# Patient Record
Sex: MALE | Race: OTHER | HISPANIC OR LATINO
[De-identification: names, ages, dates, MRNs, and addresses within clinical notes are randomized per-mention and may not be internally consistent; named-entity substitution may affect disease eponyms.]

---

## 2019-03-21 ENCOUNTER — APPOINTMENT (OUTPATIENT)
Dept: GASTROENTEROLOGY | Facility: CLINIC | Age: 59
End: 2019-03-21
Payer: MEDICARE

## 2019-03-21 ENCOUNTER — OTHER (OUTPATIENT)
Age: 59
End: 2019-03-21

## 2019-03-21 ENCOUNTER — TRANSCRIPTION ENCOUNTER (OUTPATIENT)
Age: 59
End: 2019-03-21

## 2019-03-21 VITALS
RESPIRATION RATE: 15 BRPM | OXYGEN SATURATION: 98 % | HEART RATE: 99 BPM | SYSTOLIC BLOOD PRESSURE: 160 MMHG | DIASTOLIC BLOOD PRESSURE: 103 MMHG | BODY MASS INDEX: 23.77 KG/M2 | HEIGHT: 70 IN | WEIGHT: 166 LBS

## 2019-03-21 DIAGNOSIS — Z87.891 PERSONAL HISTORY OF NICOTINE DEPENDENCE: ICD-10-CM

## 2019-03-21 DIAGNOSIS — Z78.9 OTHER SPECIFIED HEALTH STATUS: ICD-10-CM

## 2019-03-21 DIAGNOSIS — Z82.0 FAMILY HISTORY OF EPILEPSY AND OTHER DISEASES OF THE NERVOUS SYSTEM: ICD-10-CM

## 2019-03-21 PROCEDURE — 99401 PREV MED CNSL INDIV APPRX 15: CPT

## 2019-03-21 PROCEDURE — 99204 OFFICE O/P NEW MOD 45 MIN: CPT

## 2019-03-21 RX ORDER — FAMOTIDINE 20 MG/1
20 TABLET, FILM COATED ORAL
Refills: 0 | Status: DISCONTINUED | COMMUNITY
End: 2019-03-21

## 2019-03-21 NOTE — ASSESSMENT
[FreeTextEntry1] : Epigastric pain: Likely acute on chronic GERD.  Improves with famotidine.  Will stop famotidine and start omeprazole 40 mg daily and plan for EGD.  Will also send for abdominal ultrasound to rule out an pancreaticobiliary pathology.  Sending basic labs including lipase and amylase given his heavy alcohol history.\par \par CRC screening: Patient has history of colon polyps.  He is overdue for a surveillance colonoscopy.  TriLyte prep ordered.\par \par Alcohol abuse: Advised patient that with his continued consumption of alcohol, he will end up with cirrhosis.  He expressed understanding but does not appear to be interested in stopping.

## 2019-03-21 NOTE — REVIEW OF SYSTEMS
[As Noted in HPI] : as noted in HPI [Limb Weakness] : limb weakness [Difficulty Walking] : difficulty walking [Negative] : Heme/Lymph [Vomiting] : no vomiting [Constipation] : no constipation [Diarrhea] : no diarrhea [Melena] : no melena

## 2019-03-21 NOTE — HISTORY OF PRESENT ILLNESS
[de-identified] : This is a 59-year-old man with a past medical history of chronic pain secondary to a traumatic accident while working status post neurostimulator for his chronic back pain who presents for evaluation of 20 years of gastroesophageal reflux disease.  The patient reports his gastroesophageal reflux disease has been quiescent for the last several years but has recently become a problem again.  He describes upper abdominal/epigastric burning.  He is unaware of any exacerbating factors and finds that famotidine as been a little bit helpful.  Additionally, he reports that when he sleeps on his left side, his reflux is somewhat better.  He last underwent an EGD and colonoscopy approximately 10 years ago, the latter of which was notable for "some polyps".  The patient denies any changes in his weight or appetite.  He reports drinking a sixpack of beer daily as an analgesic for his chronic pain.

## 2019-03-21 NOTE — CONSULT LETTER
[Dear  ___] : Dear  [unfilled], [Consult Letter:] : I had the pleasure of evaluating your patient, [unfilled]. [Please see my note below.] : Please see my note below. [Consult Closing:] : Thank you very much for allowing me to participate in the care of this patient.  If you have any questions, please do not hesitate to contact me. [FreeTextEntry3] : Very truly yours,\par \par CHELI Goodrich MD\par [facility]\par Gastroenterology at [place]\par [address], [address]\[place] [address]\par Tel [phone]\par Fax [phone]

## 2019-03-28 ENCOUNTER — OUTPATIENT (OUTPATIENT)
Dept: OUTPATIENT SERVICES | Facility: HOSPITAL | Age: 59
LOS: 1 days | End: 2019-03-28

## 2019-03-28 ENCOUNTER — APPOINTMENT (OUTPATIENT)
Dept: ULTRASOUND IMAGING | Facility: CLINIC | Age: 59
End: 2019-03-28
Payer: MEDICARE

## 2019-03-28 DIAGNOSIS — R10.13 EPIGASTRIC PAIN: ICD-10-CM

## 2019-03-28 LAB
ALBUMIN SERPL ELPH-MCNC: 4.9 G/DL
ALP BLD-CCNC: 62 U/L
ALT SERPL-CCNC: 16 U/L
AMYLASE/CREAT SERPL: 194 U/L
ANION GAP SERPL CALC-SCNC: 12 MMOL/L
AST SERPL-CCNC: 29 U/L
BASOPHILS # BLD AUTO: 0.03 K/UL
BASOPHILS NFR BLD AUTO: 0.4 %
BILIRUB SERPL-MCNC: 0.4 MG/DL
BUN SERPL-MCNC: 5 MG/DL
CALCIUM SERPL-MCNC: 9.6 MG/DL
CHLORIDE SERPL-SCNC: 95 MMOL/L
CO2 SERPL-SCNC: 28 MMOL/L
CREAT SERPL-MCNC: 0.83 MG/DL
EOSINOPHIL # BLD AUTO: 0.04 K/UL
EOSINOPHIL NFR BLD AUTO: 0.5 %
GLUCOSE SERPL-MCNC: 92 MG/DL
HCT VFR BLD CALC: 44.4 %
HGB BLD-MCNC: 14.6 G/DL
IMM GRANULOCYTES NFR BLD AUTO: 0.1 %
INR PPP: 0.97 RATIO
LPL SERPL-CCNC: 101 U/L
LYMPHOCYTES # BLD AUTO: 2.91 K/UL
LYMPHOCYTES NFR BLD AUTO: 39.8 %
MAN DIFF?: NORMAL
MCHC RBC-ENTMCNC: 32 PG
MCHC RBC-ENTMCNC: 32.9 GM/DL
MCV RBC AUTO: 97.4 FL
MONOCYTES # BLD AUTO: 0.62 K/UL
MONOCYTES NFR BLD AUTO: 8.5 %
NEUTROPHILS # BLD AUTO: 3.7 K/UL
NEUTROPHILS NFR BLD AUTO: 50.7 %
PLATELET # BLD AUTO: 305 K/UL
POTASSIUM SERPL-SCNC: 4.7 MMOL/L
PROT SERPL-MCNC: 7.8 G/DL
PT BLD: 10.9 SEC
RBC # BLD: 4.56 M/UL
RBC # FLD: 12 %
SODIUM SERPL-SCNC: 135 MMOL/L
WBC # FLD AUTO: 7.31 K/UL

## 2019-03-28 PROCEDURE — 76700 US EXAM ABDOM COMPLETE: CPT | Mod: 26

## 2019-05-29 ENCOUNTER — APPOINTMENT (OUTPATIENT)
Dept: GASTROENTEROLOGY | Facility: CLINIC | Age: 59
End: 2019-05-29
Payer: MEDICARE

## 2019-05-29 VITALS
DIASTOLIC BLOOD PRESSURE: 82 MMHG | HEART RATE: 103 BPM | OXYGEN SATURATION: 99 % | SYSTOLIC BLOOD PRESSURE: 138 MMHG | TEMPERATURE: 98.6 F

## 2019-05-29 DIAGNOSIS — R10.13 EPIGASTRIC PAIN: ICD-10-CM

## 2019-05-29 DIAGNOSIS — F10.10 ALCOHOL ABUSE, UNCOMPLICATED: ICD-10-CM

## 2019-05-29 PROCEDURE — 99215 OFFICE O/P EST HI 40 MIN: CPT

## 2019-05-29 NOTE — REASON FOR VISIT
[Follow-Up: _____] : a [unfilled] follow-up visit [Spouse] : spouse [[employer] ] : provided by [employer]   [FreeTextEntry1] : [id number] [FreeTextEntry2] : Leni

## 2019-05-29 NOTE — CONSULT LETTER
[Dear  ___] : Dear  [unfilled], [Please see my note below.] : Please see my note below. [Courtesy Letter:] : I had the pleasure of seeing your patient, [unfilled], in my office today. [Consult Closing:] : Thank you very much for allowing me to participate in the care of this patient.  If you have any questions, please do not hesitate to contact me.

## 2019-05-29 NOTE — ASSESSMENT
[FreeTextEntry1] : Epigastric pain: EGD scheduled, continue omeprazole.\par \par CRC screening: Patient has history of colon polyps.  He is overdue for a surveillance colonoscopy.  TriLyte prep ordered.\par \par Alcohol abuse: Advised patient that with his continued consumption of alcohol, he will end up with cirrhosis.  He expressed understanding but does not appear to be interested in stopping.\par \par Lightheadedness: Possible hypoglycemic events from alcohol abuse.  Advised follow up with PCP.

## 2019-05-29 NOTE — HISTORY OF PRESENT ILLNESS
[de-identified] : Patient returns today to review his lab and ultrasound results.  Complains of new lightheadedness and feelings like he is going to faint.  No new medications or dietary changes.  Continues to drink 12 beers daily.  No rectal bleeding or melena.  Reports "flat" stool.  Blood work unremarkable.  Ultrasound OK.

## 2019-06-13 ENCOUNTER — APPOINTMENT (OUTPATIENT)
Dept: GASTROENTEROLOGY | Facility: GI CENTER | Age: 59
End: 2019-06-13

## 2019-06-17 ENCOUNTER — MEDICATION RENEWAL (OUTPATIENT)
Age: 59
End: 2019-06-17

## 2019-06-20 ENCOUNTER — OUTPATIENT (OUTPATIENT)
Dept: OUTPATIENT SERVICES | Facility: HOSPITAL | Age: 59
LOS: 1 days | End: 2019-06-20
Payer: COMMERCIAL

## 2019-06-20 ENCOUNTER — RESULT REVIEW (OUTPATIENT)
Age: 59
End: 2019-06-20

## 2019-06-20 ENCOUNTER — APPOINTMENT (OUTPATIENT)
Dept: GASTROENTEROLOGY | Facility: GI CENTER | Age: 59
End: 2019-06-20
Payer: MEDICARE

## 2019-06-20 DIAGNOSIS — D12.4 BENIGN NEOPLASM OF DESCENDING COLON: ICD-10-CM

## 2019-06-20 DIAGNOSIS — K64.8 OTHER HEMORRHOIDS: ICD-10-CM

## 2019-06-20 DIAGNOSIS — Z12.11 ENCOUNTER FOR SCREENING FOR MALIGNANT NEOPLASM OF COLON: ICD-10-CM

## 2019-06-20 DIAGNOSIS — K64.4 RESIDUAL HEMORRHOIDAL SKIN TAGS: ICD-10-CM

## 2019-06-20 PROCEDURE — 45380 COLONOSCOPY AND BIOPSY: CPT | Mod: 33

## 2019-06-20 PROCEDURE — 88305 TISSUE EXAM BY PATHOLOGIST: CPT | Mod: 26

## 2019-06-20 PROCEDURE — 45380 COLONOSCOPY AND BIOPSY: CPT | Mod: PT

## 2019-06-20 PROCEDURE — 88305 TISSUE EXAM BY PATHOLOGIST: CPT

## 2019-06-20 RX ORDER — POLYETHYLENE GLYOCOL 3350, SODIUM CHLORIDE, SODIUM BICARBONATE AND POTASSIUM CHLORIDE 420; 11.2; 5.72; 1.48 G/4L; G/4L; G/4L; G/4L
420 POWDER, FOR SOLUTION NASOGASTRIC; ORAL
Qty: 1 | Refills: 0 | Status: COMPLETED | COMMUNITY
Start: 2019-03-21 | End: 2019-06-20

## 2019-06-20 NOTE — ASSESSMENT
[FreeTextEntry1] : This is a 59-year-old man presenting for a screening colonoscopy.  The exam was notable for a diminutive sessile polyp in the descending colon that was resected and retrieved using jumbo cold forceps polypectomy.  The exam was also notable for sigmoid diverticulosis, mild.  Lastly, there were large internal hemorrhoids and moderately sized external hemorrhoids seen on retroflexed view.  There was significantly decreased anal sphincter tone on digital rectal examination.\par \par Followup pathology\par Repeat colonoscopy in five years if polyp is adenomatous

## 2019-06-20 NOTE — PROCEDURE
[With Biopsy] : with biopsy [With Polypectomy] : polypectomy [Colon Cancer Screening] : colon cancer screening [Procedure Explained] : The procedure was explained [Allergies Reviewed] : allergies reviewed. [Benefits] : benefits [Alternatives] : alternatives [Risks] : Risks [Infection] : risk of infection [Bleeding] : bleeding risk [Patient] : the patient [Consent Obtained] : written consent was obtained prior to the procedure and is detailed in the patient's record [Bowel Prep Kit] : the patient took the appropriate bowel preparation kit as directed [Approved Diet Followed] : the patient avoided solid foods and adhered to the approved diet list for 24 hours prior to the procedure [Automated Blood Pressure Cuff] : automated blood pressure cuff [Cardiac Monitor] : cardiac monitor [Pulse Oximeter] : pulse oximeter [Sedation Clearance] : the patient was cleared for moderate sedation [Propofol ___ mg IV] : Propofol [unfilled] ~Umg intravenously [3] : 3 [Withdrawal Time: ___] : Withdrawal Time:  [unfilled] [Prep Qualtiy: ___] : Prep Quality:  [unfilled] [Left Lateral Decubitus] : The patient was positioned in the left lateral decubitus position [Performed By: ___] : Performed by:  BIBIANA [Abnormal Rectum] : an abnormal rectum [Cecum (Landmarks)] : and guided to the cecum which was identified by the anatomic landmarks of the appendiceal orifice and ileocecal valve [Normal Prostate] : a normal prostate [No Difficulty] : without difficulty [Insufflated] : insufflated [Single Pass Needed] : after a single pass [Retroflex View] : a retroflex view of the rectum was performed [Normal] : Normal [Polyps] : polyps [Diverticulosis] : diverticulosis [Biopsy] : biopsy [Hemorrhoids] : hemorrhoids [Vital Signs Stable] : the vital signs were stable [Sent to Pathology] : was sent to pathology for analysis [Tolerated Well] : the patient tolerated the procedure well [No Complications] : There were no complications [de-identified] : Decreased sphincter tone [FreeTextEntry2] : Olympus [id number]

## 2019-06-20 NOTE — PROCEDURE
[With Biopsy] : with biopsy [Colon Cancer Screening] : colon cancer screening [With Polypectomy] : polypectomy [Allergies Reviewed] : allergies reviewed. [Procedure Explained] : The procedure was explained [Alternatives] : alternatives [Risks] : Risks [Benefits] : benefits [Bleeding] : bleeding risk [Infection] : risk of infection [Bowel Prep Kit] : the patient took the appropriate bowel preparation kit as directed [Patient] : the patient [Consent Obtained] : written consent was obtained prior to the procedure and is detailed in the patient's record [Approved Diet Followed] : the patient avoided solid foods and adhered to the approved diet list for 24 hours prior to the procedure [Automated Blood Pressure Cuff] : automated blood pressure cuff [Cardiac Monitor] : cardiac monitor [Pulse Oximeter] : pulse oximeter [Sedation Clearance] : the patient was cleared for moderate sedation [Propofol ___ mg IV] : Propofol [unfilled] ~Umg intravenously [3] : 3 [Prep Qualtiy: ___] : Prep Quality:  [unfilled] [Withdrawal Time: ___] : Withdrawal Time:  [unfilled] [Performed By: ___] : Performed by:  BIBIANA [Left Lateral Decubitus] : The patient was positioned in the left lateral decubitus position [Normal Prostate] : a normal prostate [Abnormal Rectum] : an abnormal rectum [Cecum (Landmarks)] : and guided to the cecum which was identified by the anatomic landmarks of the appendiceal orifice and ileocecal valve [Insufflated] : insufflated [No Difficulty] : without difficulty [Single Pass Needed] : after a single pass [Retroflex View] : a retroflex view of the rectum was performed [Normal] : Normal [Polyps] : polyps [Diverticulosis] : diverticulosis [Biopsy] : biopsy [Hemorrhoids] : hemorrhoids [Vital Signs Stable] : the vital signs were stable [Sent to Pathology] : was sent to pathology for analysis [Tolerated Well] : the patient tolerated the procedure well [No Complications] : There were no complications [de-identified] : Decreased sphincter tone [FreeTextEntry2] : Olympus [id number]

## 2019-06-24 LAB — SURGICAL PATHOLOGY STUDY: SIGNIFICANT CHANGE UP

## 2019-07-02 ENCOUNTER — OTHER (OUTPATIENT)
Age: 59
End: 2019-07-02

## 2019-07-02 DIAGNOSIS — D12.6 BENIGN NEOPLASM OF COLON, UNSPECIFIED: ICD-10-CM

## 2019-08-26 ENCOUNTER — MEDICATION RENEWAL (OUTPATIENT)
Age: 59
End: 2019-08-26

## 2019-08-26 RX ORDER — OMEPRAZOLE 40 MG/1
40 CAPSULE, DELAYED RELEASE ORAL
Qty: 90 | Refills: 1 | Status: ACTIVE | COMMUNITY
Start: 2019-03-21 | End: 1900-01-01

## 2024-05-24 ENCOUNTER — OUTPATIENT (OUTPATIENT)
Dept: OUTPATIENT SERVICES | Facility: HOSPITAL | Age: 64
LOS: 1 days | End: 2024-05-24
Payer: MEDICARE

## 2024-05-24 ENCOUNTER — APPOINTMENT (OUTPATIENT)
Dept: ULTRASOUND IMAGING | Facility: CLINIC | Age: 64
End: 2024-05-24

## 2024-05-24 DIAGNOSIS — I65.29 OCCLUSION AND STENOSIS OF UNSPECIFIED CAROTID ARTERY: ICD-10-CM

## 2024-05-24 PROCEDURE — 93880 EXTRACRANIAL BILAT STUDY: CPT | Mod: 26

## 2024-06-02 ENCOUNTER — EMERGENCY (EMERGENCY)
Facility: HOSPITAL | Age: 64
LOS: 1 days | Discharge: HOME | End: 2024-06-02
Attending: STUDENT IN AN ORGANIZED HEALTH CARE EDUCATION/TRAINING PROGRAM
Payer: SELF-PAY

## 2024-06-02 VITALS
SYSTOLIC BLOOD PRESSURE: 97 MMHG | TEMPERATURE: 98 F | WEIGHT: 167.11 LBS | RESPIRATION RATE: 20 BRPM | HEART RATE: 108 BPM | HEIGHT: 70 IN | DIASTOLIC BLOOD PRESSURE: 67 MMHG | OXYGEN SATURATION: 100 %

## 2024-06-02 LAB
ALBUMIN SERPL ELPH-MCNC: 4.3 G/DL — SIGNIFICANT CHANGE UP (ref 3.3–5.2)
ALP SERPL-CCNC: 102 U/L — SIGNIFICANT CHANGE UP (ref 40–120)
ALT FLD-CCNC: 9 U/L — SIGNIFICANT CHANGE UP
ANION GAP SERPL CALC-SCNC: 12 MMOL/L — SIGNIFICANT CHANGE UP (ref 5–17)
AST SERPL-CCNC: 12 U/L — SIGNIFICANT CHANGE UP
BASOPHILS # BLD AUTO: 0.03 K/UL — SIGNIFICANT CHANGE UP (ref 0–0.2)
BASOPHILS NFR BLD AUTO: 0.3 % — SIGNIFICANT CHANGE UP (ref 0–2)
BILIRUB SERPL-MCNC: 0.2 MG/DL — LOW (ref 0.4–2)
BUN SERPL-MCNC: 23.3 MG/DL — HIGH (ref 8–20)
CALCIUM SERPL-MCNC: 9 MG/DL — SIGNIFICANT CHANGE UP (ref 8.4–10.5)
CHLORIDE SERPL-SCNC: 98 MMOL/L — SIGNIFICANT CHANGE UP (ref 96–108)
CO2 SERPL-SCNC: 24 MMOL/L — SIGNIFICANT CHANGE UP (ref 22–29)
CREAT SERPL-MCNC: 1.66 MG/DL — HIGH (ref 0.5–1.3)
EGFR: 45 ML/MIN/1.73M2 — LOW
EOSINOPHIL # BLD AUTO: 0.09 K/UL — SIGNIFICANT CHANGE UP (ref 0–0.5)
EOSINOPHIL NFR BLD AUTO: 0.8 % — SIGNIFICANT CHANGE UP (ref 0–6)
GLUCOSE SERPL-MCNC: 108 MG/DL — HIGH (ref 70–99)
HCT VFR BLD CALC: 40.3 % — SIGNIFICANT CHANGE UP (ref 39–50)
HGB BLD-MCNC: 13.8 G/DL — SIGNIFICANT CHANGE UP (ref 13–17)
IMM GRANULOCYTES NFR BLD AUTO: 0.5 % — SIGNIFICANT CHANGE UP (ref 0–0.9)
LYMPHOCYTES # BLD AUTO: 2.75 K/UL — SIGNIFICANT CHANGE UP (ref 1–3.3)
LYMPHOCYTES # BLD AUTO: 25.3 % — SIGNIFICANT CHANGE UP (ref 13–44)
MCHC RBC-ENTMCNC: 30.4 PG — SIGNIFICANT CHANGE UP (ref 27–34)
MCHC RBC-ENTMCNC: 34.2 GM/DL — SIGNIFICANT CHANGE UP (ref 32–36)
MCV RBC AUTO: 88.8 FL — SIGNIFICANT CHANGE UP (ref 80–100)
MONOCYTES # BLD AUTO: 0.68 K/UL — SIGNIFICANT CHANGE UP (ref 0–0.9)
MONOCYTES NFR BLD AUTO: 6.3 % — SIGNIFICANT CHANGE UP (ref 2–14)
NEUTROPHILS # BLD AUTO: 7.27 K/UL — SIGNIFICANT CHANGE UP (ref 1.8–7.4)
NEUTROPHILS NFR BLD AUTO: 66.8 % — SIGNIFICANT CHANGE UP (ref 43–77)
PLATELET # BLD AUTO: 318 K/UL — SIGNIFICANT CHANGE UP (ref 150–400)
POTASSIUM SERPL-MCNC: 5.1 MMOL/L — SIGNIFICANT CHANGE UP (ref 3.5–5.3)
POTASSIUM SERPL-SCNC: 5.1 MMOL/L — SIGNIFICANT CHANGE UP (ref 3.5–5.3)
PROT SERPL-MCNC: 7.4 G/DL — SIGNIFICANT CHANGE UP (ref 6.6–8.7)
RBC # BLD: 4.54 M/UL — SIGNIFICANT CHANGE UP (ref 4.2–5.8)
RBC # FLD: 11.8 % — SIGNIFICANT CHANGE UP (ref 10.3–14.5)
SODIUM SERPL-SCNC: 134 MMOL/L — LOW (ref 135–145)
TROPONIN T, HIGH SENSITIVITY RESULT: 7 NG/L — SIGNIFICANT CHANGE UP (ref 0–51)
TROPONIN T, HIGH SENSITIVITY RESULT: 9 NG/L — SIGNIFICANT CHANGE UP (ref 0–51)
WBC # BLD: 10.87 K/UL — HIGH (ref 3.8–10.5)
WBC # FLD AUTO: 10.87 K/UL — HIGH (ref 3.8–10.5)

## 2024-06-02 PROCEDURE — 70551 MRI BRAIN STEM W/O DYE: CPT | Mod: 26,MC

## 2024-06-02 PROCEDURE — 71045 X-RAY EXAM CHEST 1 VIEW: CPT | Mod: 26

## 2024-06-02 PROCEDURE — 93010 ELECTROCARDIOGRAM REPORT: CPT

## 2024-06-02 PROCEDURE — 70450 CT HEAD/BRAIN W/O DYE: CPT | Mod: 26,MC

## 2024-06-02 PROCEDURE — 71275 CT ANGIOGRAPHY CHEST: CPT | Mod: 26,MC

## 2024-06-02 PROCEDURE — 99223 1ST HOSP IP/OBS HIGH 75: CPT

## 2024-06-02 PROCEDURE — 74174 CTA ABD&PLVS W/CONTRAST: CPT | Mod: 26,MC

## 2024-06-02 RX ORDER — SODIUM CHLORIDE 9 MG/ML
1000 INJECTION INTRAMUSCULAR; INTRAVENOUS; SUBCUTANEOUS ONCE
Refills: 0 | Status: COMPLETED | OUTPATIENT
Start: 2024-06-02 | End: 2024-06-02

## 2024-06-02 RX ORDER — FINASTERIDE 5 MG/1
5 TABLET, FILM COATED ORAL DAILY
Refills: 0 | Status: DISCONTINUED | OUTPATIENT
Start: 2024-06-02 | End: 2024-06-10

## 2024-06-02 RX ORDER — MIDODRINE HYDROCHLORIDE 2.5 MG/1
10 TABLET ORAL THREE TIMES A DAY
Refills: 0 | Status: DISCONTINUED | OUTPATIENT
Start: 2024-06-02 | End: 2024-06-10

## 2024-06-02 RX ADMIN — SODIUM CHLORIDE 1000 MILLILITER(S): 9 INJECTION INTRAMUSCULAR; INTRAVENOUS; SUBCUTANEOUS at 20:50

## 2024-06-02 RX ADMIN — SODIUM CHLORIDE 1000 MILLILITER(S): 9 INJECTION INTRAMUSCULAR; INTRAVENOUS; SUBCUTANEOUS at 21:30

## 2024-06-02 RX ADMIN — MIDODRINE HYDROCHLORIDE 10 MILLIGRAM(S): 2.5 TABLET ORAL at 22:57

## 2024-06-02 RX ADMIN — FINASTERIDE 5 MILLIGRAM(S): 5 TABLET, FILM COATED ORAL at 22:57

## 2024-06-02 RX ADMIN — SODIUM CHLORIDE 1000 MILLILITER(S): 9 INJECTION INTRAMUSCULAR; INTRAVENOUS; SUBCUTANEOUS at 17:44

## 2024-06-02 NOTE — CONSULT NOTE ADULT - SUBJECTIVE AND OBJECTIVE BOX
CHIEF COMPLAINT:    HPI: 64-year-old man with hx of Coronary artery diseased based on minimally elevated CT coronary calcium score of 1 (21st percentile) in May 2024, Orthostatic hypotension, Recurrent syncope., Borderline hyperlipidemia. Patient is known to Dr Groves who recently increased his midodrine to 10mg bid. Patient comes to the ED in company of his son that states that his father has been passing out for months, today he passed out again and is weaker than the other times.       PAST MEDICAL & SURGICAL HISTORY:      Allergies    No Known Allergies    Intolerances        MEDICATIONS  (STANDING):    MEDICATIONS  (PRN):      FAMILY HISTORY:      ***No family history of premature coronary artery disease or sudden cardiac death    SOCIAL HISTORY:  Smoking-  Alcohol-  Illicit Drug use-    REVIEW OF SYSTEMS:  Constitutional: [ ] fever, [ ]weight loss,  [ ]fatigue  Eyes: [ ] visual changes  Respiratory: [ ]shortness of breath;  [ ] cough, [ ]wheezing, [ ]chills, [ ]hemoptysis  Cardiovascular: [ ] chest pain, [ ]palpitations, [ ]dizziness,  [ ]leg swelling [ X]syncope  Gastrointestinal: [ ] abdominal pain, [ ]nausea, [ ]vomiting,  [ ]diarrhea   Genitourinary: [ ] dysuria, [ ] hematuria  Neurologic: [ ] headaches [ ] tremors  [ ] weakness [ ] lightheadedness  Skin: [ ] itching, [ ]burning, [ ] rashes  Endocrine: [ ] heat or cold intolerance  Musculoskeletal: [ ] joint pain or swelling; [ ] muscle, back, or extremity pain  Psychiatric: [ ] depression, [ ]anxiety, [ ]mood swings, or [ ]difficulty sleeping  Hematologic: [ ] easy bruising, [ ] bleeding gums     [ x] All others negative	  [ ] Unable to obtain    Vital Signs Last 24 Hrs  T(C): 36.9 (02 Jun 2024 13:55), Max: 36.9 (02 Jun 2024 13:55)  T(F): 98.4 (02 Jun 2024 13:55), Max: 98.4 (02 Jun 2024 13:55)  HR: 108 (02 Jun 2024 13:55) (108 - 108)  BP: 97/67 (02 Jun 2024 13:55) (97/67 - 97/67)  BP(mean): --  RR: 20 (02 Jun 2024 13:55) (20 - 20)  SpO2: 100% (02 Jun 2024 13:55) (100% - 100%)    Parameters below as of 02 Jun 2024 13:55  Patient On (Oxygen Delivery Method): room air      I&O's Summary      PHYSICAL EXAM:  General: No acute distress  HEENT: EOMI  Neck:  No JVD  Lungs: Clear to auscultation bilaterally; No rales or wheezing  Heart: Regular rate and rhythm; No murmurs, rubs, or gallops  Abdomen: soft, non tender, non distended   Extremities: warm, no edema   Nervous system:  Alert & Oriented X3  Psychiatric: Normal affect  Skin: No rashes or lesions    LABS:        Creatinine Trend:         Lipid Panel:   Cardiac Enzymes:           RADIOLOGY:    ECG: < from: 12 Lead ECG (06.02.24 @ 14:02) >  Normal sinus rhythm  Normal ECG        TELEMETRY:    Coronary artery calcium score 5/8/24 at [facility] in [place]: Total calcium score of 1 (21st percentile) with distribution as follows: left main 0, LAD 0, left circumflex 0, RCA 1, mild aortic arch calcification, visualized lungs and mediastinum are unremarkable, no cardiomegaly, thoracic aorta is not dilated.   Lexiscan nuclear stress test 4/17/24: Normal perfusion with no evidence of ischemia or infarction, normal LV function, EF 79%.  Echocardiogram 2/26/24 at an outside facility in [facility]: Normal LV function. EF 60%. Grade 1 diastolic dysfunction. Mild left ventricular hypertrophy. Mild MR, mild TR.  Carotid duplex scan 2/26/24 at an outside facility in [facility]: Homogeneous plaque without calcification causing 31% right common carotid artery stenosis. No stenosis on the left.   3-day event monitor 4/17/24 - 4/20/24: Normal sinus rhythm with average heart rate of 85 bpm with one PVC or less than 0.1% consisting of a single, and one APC or less than 0.1% consisting of a single, one patient triggered symptomatic event correlating with episode of sinus tachycardia at a rate of 108 bpm.  Abdominal ultrasound 2/14/24 at an outside facility in [facility]: Normal liver, normal kidneys, normal abdominal aorta.   CHIEF COMPLAINT:    HPI: 64-year-old man with hx of Coronary artery diseased based on minimally elevated CT coronary calcium score of 1 (21st percentile) in May 2024, Orthostatic hypotension, Recurrent syncope., Borderline hyperlipidemia. Patient is known to Dr Groves who recently increased his midodrine to 10mg bid. Patient comes to the ED in company of his son that states that his father has been passing out for months, today he passed out again and is weaker than the other times. According to the Patient, today while he was about to take shower he felt nuchal pain, become dizzy and needed to sit down to prevent himself to collapse to the floor, per him he lost consciousness, his son that was at his house heard a noise and found him unconscious at the floor. Patient Reports several episodes in the past, some of those episodes seemed to be triggered by his chronic cervical pain and sometimes when he stands up quickly.   In the past pain suffered from chronic lower back pain that was treated with opioids making him at that time dependant of them, he managed to wean him self off the opioids and received a nerve stimulator on his back to treat his chronic pain, unfortunately with no success, per patient he has learn to live with pain.   He denies chest pain, HF symptoms.           PAST MEDICAL & SURGICAL HISTORY:      Allergies    No Known Allergies    Intolerances        MEDICATIONS  (STANDING):    MEDICATIONS  (PRN):      FAMILY HISTORY:      ***No family history of premature coronary artery disease or sudden cardiac death    SOCIAL HISTORY:  Smoking-  Alcohol-  Illicit Drug use-    REVIEW OF SYSTEMS:  Constitutional: [ ] fever, [ ]weight loss,  [ ]fatigue  Eyes: [ ] visual changes  Respiratory: [ ]shortness of breath;  [ ] cough, [ ]wheezing, [ ]chills, [ ]hemoptysis  Cardiovascular: [ ] chest pain, [ ]palpitations, [ ]dizziness,  [ ]leg swelling [ X]syncope  Gastrointestinal: [ ] abdominal pain, [ ]nausea, [ ]vomiting,  [ ]diarrhea   Genitourinary: [ ] dysuria, [ ] hematuria  Neurologic: [ ] headaches [ ] tremors  [ ] weakness [ ] lightheadedness  Skin: [ ] itching, [ ]burning, [ ] rashes  Endocrine: [ ] heat or cold intolerance  Musculoskeletal: [ ] joint pain or swelling; [x ] muscle, back, or extremity pain  Psychiatric: [ ] depression, [ ]anxiety, [ ]mood swings, or [ ]difficulty sleeping  Hematologic: [ ] easy bruising, [ ] bleeding gums     [ x] All others negative	  [ ] Unable to obtain    Vital Signs Last 24 Hrs  T(C): 36.9 (02 Jun 2024 13:55), Max: 36.9 (02 Jun 2024 13:55)  T(F): 98.4 (02 Jun 2024 13:55), Max: 98.4 (02 Jun 2024 13:55)  HR: 108 (02 Jun 2024 13:55) (108 - 108)  BP: 97/67 (02 Jun 2024 13:55) (97/67 - 97/67)  BP(mean): --  RR: 20 (02 Jun 2024 13:55) (20 - 20)  SpO2: 100% (02 Jun 2024 13:55) (100% - 100%)    Parameters below as of 02 Jun 2024 13:55  Patient On (Oxygen Delivery Method): room air      I&O's Summary      PHYSICAL EXAM:  General: No acute distress  HEENT: EOMI  Neck:  No JVD  Lungs: Clear to auscultation bilaterally; No rales or wheezing  Heart: Regular rate and rhythm; No murmurs, rubs, or gallops  Abdomen: soft, non tender, non distended   Extremities: warm, no edema   Nervous system:  Alert & Oriented X3  Psychiatric: Normal affect  Skin: No rashes or lesions    LABS:        Creatinine Trend:         Lipid Panel:   Cardiac Enzymes:           RADIOLOGY:    ECG: < from: 12 Lead ECG (06.02.24 @ 14:02) >  Normal sinus rhythm  Normal ECG        TELEMETRY: SR    Coronary artery calcium score 5/8/24 at [facility] in [place]: Total calcium score of 1 (21st percentile) with distribution as follows: left main 0, LAD 0, left circumflex 0, RCA 1, mild aortic arch calcification, visualized lungs and mediastinum are unremarkable, no cardiomegaly, thoracic aorta is not dilated.   Lexiscan nuclear stress test 4/17/24: Normal perfusion with no evidence of ischemia or infarction, normal LV function, EF 79%.  Echocardiogram 2/26/24 at an outside facility in [facility]: Normal LV function. EF 60%. Grade 1 diastolic dysfunction. Mild left ventricular hypertrophy. Mild MR, mild TR.  Carotid duplex scan 2/26/24 at an outside facility in [place]: Homogeneous plaque without calcification causing 31% right common carotid artery stenosis. No stenosis on the left.   3-day event monitor 4/17/24 - 4/20/24: Normal sinus rhythm with average heart rate of 85 bpm with one PVC or less than 0.1% consisting of a single, and one APC or less than 0.1% consisting of a single, one patient triggered symptomatic event correlating with episode of sinus tachycardia at a rate of 108 bpm.  Abdominal ultrasound 2/14/24 at an outside facility in [facility]: Normal liver, normal kidneys, normal abdominal aorta.

## 2024-06-02 NOTE — ED ADULT NURSE NOTE - NS ED NOTE ABUSE RESPONSE YN
Elsa-    Please let him know that I refilled the medication for 30 pills.  I'm not sure if this reason why the medication was denied, so please route this to Dr. Sosa and she can proceed from there    LB  
Yes

## 2024-06-02 NOTE — ED PROVIDER NOTE - OBJECTIVE STATEMENT
64-year-old male with past medical history of orthostatic hypotension and BPH presents complaining of multiple syncopal episodes, was started on midodrine 5 mg, last week had increased to 10, takes it twice daily (1 pill in the morning, and 1 just before bed?) 64-year-old male with past medical history of orthostatic hypotension and BPH presents complaining of multiple syncopal episodes, was started on midodrine 5 mg, last week had increased to 10, takes it twice daily (1 pill in the morning, and 1 just before bed?). Today while in the shower he felt a syncopal episode coming on, laid down in the shower and lost consciousness. This has been happening with increasing regularity, usually while standing up for some time, often while urinating and occasionally in the shower. He had a similar episode in [facility] with multiple frequent syncopal episodes, was unable to be treated there so came [place] the [place] for further work up. He was started on 5 mg midodrine, had a CT angio of the coronaries and recently had it increased to 10 mg BID and initially felt it was helping, but now is not. He also takes finasteride in the morning. He initially could not recall the name of his cardiologist, but was able to find out that it was Dr. Verdugo. He notes some chest pain at this time, but does not usually have chest pain.

## 2024-06-02 NOTE — ED ADULT NURSE NOTE - NSFALLRISKINTERV_ED_ALL_ED
Assistance OOB with selected safe patient handling equipment if applicable/Communicate fall risk and risk factors to all staff, patient, and family/Orthostatic vital signs/Provide visual cue: yellow wristband, yellow gown, etc/Reinforce activity limits and safety measures with patient and family/Call bell, personal items and telephone in reach/Instruct patient to call for assistance before getting out of bed/chair/stretcher/Non-slip footwear applied when patient is off stretcher/[place] to call system/Physically safe environment - no spills, clutter or unnecessary equipment/Purposeful Proactive Rounding/Room/bathroom lighting operational, light cord in reach

## 2024-06-02 NOTE — ED ADULT TRIAGE NOTE - WEIGHT METHOD
MICU Progress Note      SUBJECTIVE     Overnight:  Sugars remained high, gap still closed, received additional 30u lantus last night and increased lispro to 10 TID with meals and HDSS    Patient seen and examined this morning  Patient denies CP, SOB, abdominal pain, nausea/vomiting, dizziness - was up and into the chair yesterday, did not feel dizzy or unsteady on his feet  Continues with good appetite    Inpatient Meds  Current Facility-Administered Medications   Medication   • sodium chloride (PF) 0.9 % injection 10 mL   • sodium chloride (PF) 0.9 % injection 10 mL   • insulin glargine (LANTUS) injection 20 Units   • insulin lispro (ADMELOG,HumaLOG) - Scheduled Mealtime Dose   • insulin lispro (ADMELOG,HumaLOG) - Correction Dose   • insulin lispro (ADMELOG,HumaLOG) - Correction Dose   • dextrose 50 % injection 25 g   • dextrose 50 % injection 12.5 g   • glucagon (GLUCAGEN) injection 1 mg   • dextrose (GLUTOSE) 40 % gel 15 g   • dextrose (GLUTOSE) 40 % gel 30 g   • sodium chloride 0.9 % flush bag 25 mL   • sodium chloride (PF) 0.9 % injection 2 mL   • sodium chloride 0.9% infusion   • sodium chloride 0.9% infusion   • [Held by provider] NIFEdipine XL (PROCARDIA XL) ER tablet 90 mg   • pantoprazole (PROTONIX) EC tablet 40 mg   • levothyroxine (SYNTHROID, LEVOTHROID) tablet 50 mcg   • atorvastatin (LIPITOR) tablet 40 mg   • aspirin (ECOTRIN) enteric coated tablet 81 mg   • labetalol (NORMODYNE) injection 20 mg   • metoPROLOL succinate (TOPROL-XL) ER tablet 100 mg   • heparin (porcine) injection 5,000 Units   • ondansetron (ZOFRAN) injection 4 mg        OBJECTIVE     VITAL SIGNS:     Vital Last Value 24 Hour Range   Temperature 97.3 °F (36.3 °C) (03/28/23 0000) Temp  Min: 97.3 °F (36.3 °C)  Max: 98 °F (36.7 °C)   Pulse 94 (03/28/23 0400) Pulse  Min: 80  Max: 107   Respiratory 16 (03/28/23 0400) Resp  Min: 11  Max: 16   Non-Invasive  Blood Pressure (!) 155/92 (03/28/23 0400) BP  Min: 86/63  Max: 155/92   Pulse Oximetry  94 % (03/28/23 0400) SpO2  Min: 92 %  Max: 97 %     VENT STATUS:   Vent Settings Last Value       Mode     Rate     Tidal Volume     FiO2     PEEP/CPAC/EPAP     Pressure Support     Peak pressure    Plateau pressure    Compliance         INTAKE/OUTPUT:  I/O last 3 completed shifts:  In: 3710 [P.O.:1920; I.V.:1494.7; IV Piggyback:295.4]  Out: 2425 [Urine:2425]  No intake/output data recorded.    Intake/Output Summary (Last 24 hours) at 3/28/2023 0710  Last data filed at 3/28/2023 0559  Gross per 24 hour   Intake 3710.02 ml   Output 2425 ml   Net 1285.02 ml       PHYSICAL EXAM    Physical Exam  Constitutional:       General: He is not in acute distress.  HENT:      Head: Normocephalic and atraumatic.   Eyes:      Extraocular Movements: Extraocular movements intact.      Conjunctiva/sclera: Conjunctivae normal.   Cardiovascular:      Rate and Rhythm: Normal rate and regular rhythm.      Heart sounds: Normal heart sounds.   Pulmonary:      Effort: Pulmonary effort is normal. No respiratory distress.      Breath sounds: Normal breath sounds.   Abdominal:      General: Abdomen is flat. Bowel sounds are normal. There is no distension.      Palpations: Abdomen is soft.   Musculoskeletal:      Right lower leg: No edema.      Left lower leg: No edema.   Skin:     General: Skin is warm.   Neurological:      Mental Status: He is alert and oriented to person, place, and time.   Psychiatric:         Mood and Affect: Mood normal.         Behavior: Behavior normal.         Labs     Recent Labs   Lab 03/28/23  0356 03/27/23  2209 03/27/23  1755 03/27/23  1249 03/27/23  0837   SODIUM 136 132* 138 138 141   POTASSIUM 3.7 4.2 3.8 3.8 3.6   CHLORIDE 108 105 111* 111* 113*   CO2 25 22 20* 23 23   ANIONGAP 7 9 11 8 9   BUN 39* 46* 38* 44* 48*   CREATININE 1.55* 1.77* 1.46* 1.59* 1.64*   CALCIUM 9.2 9.1 8.2* 9.5 9.4   GLUCOSE 421* 526* 288* 221* 165*         Recent Labs   Lab 03/28/23  0356 03/27/23  0455 03/26/23  1337   HGB 15.5 16.0  17.4*   HCT 46.8 47.8 55.6*    198 308   WBC 7.4 12.6* 13.5*         Recent Labs   Lab 03/28/23  0356 03/27/23  0455 03/26/23  2122   MG 2.3 2.4 2.7*   PHOS 2.1* 1.7* 3.5         No results found      Recent Labs   Lab 03/28/23  0356 03/26/23  1337   ALBUMIN 2.6* 3.6   AST 17 10       UA  Lab Results   Component Value Date    UWBC Negative 03/26/2023    UWBC TRACE (A) 03/27/2019    URBC Moderate (A) 03/26/2023    URBC MODERATE (A) 03/27/2019        Imaging    CT HEAD WO CONTRAST   Final Result   No acute abnormalities.       Electronically Signed by: SEGUNDO HANSON M.D.    Signed on: 3/26/2023 5:21 PM    Workstation ID: [id number]      CT CHEST ABDOMEN PELVIS WO CONTRAST   Final Result   1.  Patient status post right nephrectomy.   2.  No evidence of new/worsening metastatic disease.   3.  Left renal 1 to 2 mm calyceal nonobstructive stones.  Other findings as   described above.      Electronically Signed by: AWILDA LOPEZ M.D.    Signed on: 3/26/2023 5:31 PM    Workstation ID: [id number]      XR CHEST AP OR PA   Final Result   1. No acute cardiopulmonary process.      Electronically Signed by: AWILDA LOPEZ M.D.    Signed on: 3/26/2023 4:12 PM    Workstation ID: [id number]              ASSESSMENT AND PLAN     Ella is a 61 year old male with history of HTN, hypothyroidism, RCC s/p nephrectomy in 2019 follows with Dr. Loyd, now with lung cancer currently getting chemo pembrolizumab and axitinib (last chemo about a week and half ago),  Hx R ICA stent 2019,  presenting with generalized fatigue, nausea/vomiting for the past 3 days.  He was found to be in DKA and admitted to medical ICU for further management.      Neurology  -no active issues     Endocrine  #DKA, resolved  #New diagnosis of diabetes, likely autoimmune iso Keytruda   #Hypothyroidism  - has hx of prediabetes, A1c this admission 8.8  - VBG with acidosis 7.08, BHB >8.0, AG 29 on presentation  Plan:  - Off insulin drip since 3/27,  initially started on 0.3u/kg weight based dosing at lantus 20u + lispro 5 TID with meals and LDSS correction, however sugars not controlled, increase to lantus 50 u daily, 15 TID with meals, MDSS will re-evaluate sugars today in response to new regimen  - Give additional 10 units humalog per endo this morning for hyperglycemia  - check for Islet cell, insulin, DAMION, zinc transporter antibodies to rule out type 1 DM   - Per oncology, pembrolizumab can trigger DAMION antibody generation which is treated with high dose steroids, will await DAMION antibody results prior to treating per endo  - Endo consult placed, appreciate recs   - TSH, free T4, 8 am cortisol, C peptide on morning labs   - Continue home levothyroxine 50 mcg   - TSH and free T4 wnl 3/28     Cardiology  #HTN  #HLD  - Resume Home antihypertensives: metoprolol succinate 100mg daily, resume home nifedipine 90mg daily   - Holding home lisinopril 40 mg in the setting of CHEKO on CKD  - Holding home hydrochlorothiazide  - Continue home aspirin and statin  - TTE: 2019 - EF 60-65%       Respiratory  No acute issues  - Goal SpO2: > 90     ID  #Immunocompromised on chemotherapy  #Leukocytosis likely reactive, resolved  - CT C/A/P negative for acute changes, no worsening mets seen  - UA negative for UTI, moderate blood  - Blood cultures NGTD  - CTM off abx     Renal/  #CHEKO likely 2/2 dehydration due to DKA, improving  - Baseline Cr appears around 1.2-1.4  - Cr on admission 2.44  Plan:  - monitor UOP and Cr   - Avoid all nephrotoxins as able     GI  #GERD  - Continue home PPI  - Initiate carb consistent diet  - IV zofran 4 mg q12h prn for nausea (resolved)     Heme/Onc  #Renal cell carcinoma s/p right nephrectomy  - On pebrolizumab 200mg IV q3 weeks with axitinib 5mg PO BID  - Patient of Dr. Loyd  Plan:  - Hold home axitinib  - Onc consulted, appreciate recs    DVT Prophylaxis: heparin ppx  GI Prophylaxis: PPI  Fluids: none  Electrolytes: replete as needed  Nutrition:  Carb consistent diet  PT/OT: as tolerated  Tele: yes  T/L/D: peripheral IV's, port, midline  Code Status: Full code, decisional, wife is SDM  PCP: Raj Travis      Discussed with intensivist. Please see attending physician note for final recommendations.     Mable Gonzales MD  Family Medicine PGY-1  Page via [employer]     actual

## 2024-06-02 NOTE — ED PROVIDER NOTE - NS ED ROS FT
Const: Denies fever, chills  HEENT: Denies blurry vision, sore throat  Neck: Denies neck pain/stiffness  Resp: Denies coughing, SOB  Cardiovascular: +CP, + orthostatic hypotension. Denies palpitations, LE edema  GI: Denies nausea, vomiting, abdominal pain, diarrhea, constipation, blood in stool  : Denies urinary frequency/urgency/dysuria, hematuria  MSK: Denies back pain  Neuro: Denies HA, dizziness, numbness, weakness  Skin: Denies rashes.

## 2024-06-02 NOTE — ED PROVIDER NOTE - PHYSICAL EXAMINATION
Const: Awake, alert and oriented. In no acute distress. Pale and clammy.  HEENT: NC/AT. Moist mucous membranes.  Eyes: No scleral icterus. EOMI.  Neck:. Soft and supple. Full ROM without pain.  Cardiac: Tachycardic when standing, regular rate & rhythm while lying down. +S1/S2. No murmurs. Peripheral pulses 2+ and symmetric. No LE edema.  Resp: Speaking in full sentences. No evidence of respiratory distress. No wheezes, rales or rhonchi.  Abd: Soft, non-tender, non-distended. Normal bowel sounds in all 4 quadrants. No guarding or rebound.  Back: Spine midline and non-tender. No CVAT.  Skin: No rashes, abrasions or lacerations.  Neuro: Awake, alert & oriented x 3. Moves all extremities symmetrically.

## 2024-06-02 NOTE — ED CDU PROVIDER INITIAL DAY NOTE - NS ED ROS FT
Const: Denies fever, chills  HEENT: Denies blurry vision, sore throat  Neck: Denies neck pain/stiffness  Resp: Denies coughing, SOB  Cardiovascular:  + orthostatic hypotension. Denies palpitations, LE edema  GI: Denies nausea, vomiting, abdominal pain, diarrhea, constipation, blood in stool  : Denies urinary frequency/urgency/dysuria, hematuria  MSK: Denies back pain  Neuro: Denies HA, dizziness, numbness, weakness  Skin: Denies rashes.

## 2024-06-02 NOTE — ED CDU PROVIDER INITIAL DAY NOTE - OBJECTIVE STATEMENT
64M pmh orthostatic hypotension, BPH, vertigo presenting with multiple syncopal episodes. Pt was started on midodrine 5mg. Dose was increased to 1mg bid. Pt was in the shower today when he felt lightheaded and laid down in the shower and lost consciousness. States this has been happening more often recently. Denies cp, sob.   Cardiologist: Dr Groves

## 2024-06-02 NOTE — CONSULT NOTE ADULT - ASSESSMENT
64-year-old man with hx of Coronary artery diseased based on minimally elevated CT coronary calcium score of 1 (21st percentile) in May 2024, Orthostatic hypotension, Recurrent syncope., Borderline hyperlipidemia. Patient is known to Dr Groves who recently increased his midodrine to 10mg bid. Patient comes to the ED in company of his son that states that his father has been passing out for months, today he passed out again and is weaker than the other times. According to the Patient, today while he was about to take shower he felt nuchal pain, become dizzy and needed to sit down to prevent himself to collapse to the floor, per him he lost consciousness, his son that was at his house heard a noise and found him unconscious at the floor. Patient Reports several episodes in the past, some of those episodes seemed to be triggered by his chronic cervical pain and sometimes when he stands up quickly.   In the past pain suffered from chronic lower back pain that was treated with opioids making him at that time dependant of them, he managed to wean him self off the opioids and received a nerve stimulator on his back to treat his chronic pain, unfortunately with no success, per patient he has learn to live with pain.   He denies chest pain, HF symptoms.           Orthostatic hypotension with recurrent syncope    Plan   continue with telemetry   echocardiogram   increase midodrine to 10mg TID   obtain cortisol AM, TSH, Vit B12  consider neurology consult for evaluation of neurodegenerative conditions predisposing his symptoms   PT-OT   Will continue to follow.

## 2024-06-02 NOTE — ED PROVIDER NOTE - CLINICAL SUMMARY MEDICAL DECISION MAKING FREE TEXT BOX
64-year-old male with past medical history of orthostatic hypotension on midodrine presents for multiple, frequent syncopal episodes despite being compliant with 10 mg twice daily of midodrine.  Patient also complaining of chest pain at this time.  Patient significantly orthostatic, with blood pressure in the 70s while standing and 150s while lying down.  He took his midodrine this morning.  EKG normal sinus, chest x-ray does not show mediastinal widening.  Will check labs including troponin, cardiology consultation, patient will need medication adjustment.

## 2024-06-02 NOTE — ED ADULT NURSE NOTE - OBJECTIVE STATEMENT
Pt here for multiple syncopal episodes increasing in regularity. Pt complains of slight chest pain at this time. Pt had syncope in shower today and lost consciousness, denies head trauma.

## 2024-06-02 NOTE — ED ADULT TRIAGE NOTE - CHIEF COMPLAINT QUOTE
as per son pt has been passing out for months, today he passed out again and is weaker than the other times, pt was diagnosed with orthostatic hypotension.

## 2024-06-02 NOTE — ED CDU PROVIDER INITIAL DAY NOTE - CLINICAL SUMMARY MEDICAL DECISION MAKING FREE TEXT BOX
64M pmh orthostatic hypotension, BPH, vertigo presenting with multiple syncopal episodes. In ED, troponins stable. CT head with expanded sella turcica, otherwise without acute findings. Dissection study without acute findings. Pt evaluated by cardiology, recommending increasing midodrine to 10mg TID and monitoring tele overnight. Pending TTE and PT eval. MR brain added. Of note cr 1.6, unknown baseline. Fluids given and pending repeat labs.

## 2024-06-02 NOTE — ED CDU PROVIDER INITIAL DAY NOTE - ATTENDING APP SHARED VISIT CONTRIBUTION OF CARE
64M pmh orthostatic hypotension, BPH, vertigo presenting with multiple syncopal episodes. In ED, troponins stable. CT dissection negative, CT head with expanded sella turcica, otherwise without acute findings. Seen by cardiology recommending echo and tele monitoring. Will also get MR brain

## 2024-06-03 ENCOUNTER — RESULT REVIEW (OUTPATIENT)
Age: 64
End: 2024-06-03

## 2024-06-03 LAB
ANION GAP SERPL CALC-SCNC: 12 MMOL/L — SIGNIFICANT CHANGE UP (ref 5–17)
BUN SERPL-MCNC: 13.5 MG/DL — SIGNIFICANT CHANGE UP (ref 8–20)
CALCIUM SERPL-MCNC: 9.1 MG/DL — SIGNIFICANT CHANGE UP (ref 8.4–10.5)
CHLORIDE SERPL-SCNC: 103 MMOL/L — SIGNIFICANT CHANGE UP (ref 96–108)
CO2 SERPL-SCNC: 23 MMOL/L — SIGNIFICANT CHANGE UP (ref 22–29)
CORTIS AM PEAK SERPL-MCNC: 17.2 UG/DL — SIGNIFICANT CHANGE UP (ref 6–18.4)
CREAT SERPL-MCNC: 1.08 MG/DL — SIGNIFICANT CHANGE UP (ref 0.5–1.3)
EGFR: 76 ML/MIN/1.73M2 — SIGNIFICANT CHANGE UP
GLUCOSE SERPL-MCNC: 97 MG/DL — SIGNIFICANT CHANGE UP (ref 70–99)
POTASSIUM SERPL-MCNC: 4.7 MMOL/L — SIGNIFICANT CHANGE UP (ref 3.5–5.3)
POTASSIUM SERPL-SCNC: 4.7 MMOL/L — SIGNIFICANT CHANGE UP (ref 3.5–5.3)
SODIUM SERPL-SCNC: 138 MMOL/L — SIGNIFICANT CHANGE UP (ref 135–145)

## 2024-06-03 PROCEDURE — 99232 SBSQ HOSP IP/OBS MODERATE 35: CPT

## 2024-06-03 PROCEDURE — 93306 TTE W/DOPPLER COMPLETE: CPT | Mod: 26

## 2024-06-03 RX ADMIN — MIDODRINE HYDROCHLORIDE 10 MILLIGRAM(S): 2.5 TABLET ORAL at 18:20

## 2024-06-03 RX ADMIN — FINASTERIDE 5 MILLIGRAM(S): 5 TABLET, FILM COATED ORAL at 11:48

## 2024-06-03 RX ADMIN — MIDODRINE HYDROCHLORIDE 10 MILLIGRAM(S): 2.5 TABLET ORAL at 11:48

## 2024-06-03 RX ADMIN — MIDODRINE HYDROCHLORIDE 10 MILLIGRAM(S): 2.5 TABLET ORAL at 05:59

## 2024-06-03 NOTE — PROGRESS NOTE ADULT - SUBJECTIVE AND OBJECTIVE BOX
QIANA HANNA  [id number]      Chief Complaint:  Follow up orthostatic hypotension/ recurrent syncope    Interval History:  no events overnight     Tele:  SR      finasteride 5 milliGRAM(s) Oral daily  midodrine. 10 milliGRAM(s) Oral three times a day          Physical Exam:  T(C): 36.6 (06-03-24 @ 07:40), Max: 37.1 (06-02-24 @ 15:21)  HR: 77 (06-03-24 @ 07:40) (77 - 108)  BP: 112/74 (06-03-24 @ 07:40) (97/67 - 161/106)  RR: 20 (06-03-24 @ 07:40) (16 - 20)  SpO2: 99% (06-03-24 @ 07:40) (98% - 100%)  Wt(kg): --  General: Comfortable in NAD  Neck: No JVD  CVS: nl s1s2, no s3  Pulm: CTA b/l  Abd: soft, non-tender  Ext: No c/c/e  Neuro A&O x3  Psych: Normal affect      Labs:   03 Jun 2024 08:10    138    |  103    |  13.5   ----------------------------<  97     4.7     |  23.0   |  1.08     Ca    9.1        03 Jun 2024 08:10    TPro  7.4    /  Alb  4.3    /  TBili  0.2    /  DBili  x      /  AST  12     /  ALT  9      /  AlkPhos  102    02 Jun 2024 15:05                          13.8   10.87 )-----------( 318      ( 02 Jun 2024 15:05 )             40.3               [facility] [place]: Total calcium score of 1 (21st percentile) with distribution as follows: left main 0, LAD 0, left circumflex 0, RCA 1, mild aortic arch calcification, visualized lungs and mediastinum are unremarkable, no cardiomegaly, thoracic aorta is not dilated.   Lexiscan nuclear stress test 4/17/24: Normal perfusion with no evidence of ischemia or infarction, normal LV function, EF 79%.  Echocardiogram 2/26/24 at an outside facility in [facility]: Normal LV function. EF 60%. Grade 1 diastolic dysfunction. Mild left ventricular hypertrophy. Mild MR, mild TR.  Carotid duplex scan 2/26/24 at an outside facility in [facility]: Homogeneous plaque without calcification causing 31% right common carotid artery stenosis. No stenosis on the left.   3-day event monitor 4/17/24 - 4/20/24: Normal sinus rhythm with average heart rate of 85 bpm with one PVC or less than 0.1% consisting of a single, and one APC or less than 0.1% consisting of a single, one patient triggered symptomatic event correlating with episode of sinus tachycardia at a rate of 108 bpm.  Abdominal ultrasound 2/14/24 at an outside facility in [facility]: Normal liver, normal kidneys, normal abdominal aorta.        · Assessment	  64-year-old man with hx of Coronary artery diseased based on minimally elevated CT coronary calcium score of 1 (21st percentile) in May 2024, Orthostatic hypotension, Recurrent syncope., Borderline hyperlipidemia. Patient is known to Dr Groves who recently increased his midodrine to 10mg bid. Patient comes to the ED in company of his son that states that his father has been passing out for months, today he passed out again and is weaker than the other times. According to the Patient, today while he was about to take shower he felt nuchal pain, become dizzy and needed to sit down to prevent himself to collapse to the floor, per him he lost consciousness, his son that was at his house heard a noise and found him unconscious at the floor. Patient Reports several episodes in the past, some of those episodes seemed to be triggered by his chronic cervical pain and sometimes when he stands up quickly.   In the past pain suffered from chronic lower back pain that was treated with opioids making him at that time dependant of them, he managed to wean him self off the opioids and received a nerve stimulator on his back to treat his chronic pain, unfortunately with no success, per patient he has learn to live with pain.   He denies chest pain, HF symptoms.           Orthostatic hypotension with recurrent syncope  In the ED patient noted to have FIONA, that responded to IVF  echo at glance with normal biventricular systolic function and nonsignificant valvular disease.   unremarkable TSH, Vit B12  Imaging unrevealing     Plan   continue with  midodrine to 10mg TID   add fludrocortisone 0.1mg PO qd   pending  cortisol AM,   unremarkable TSH, Vit B12  consider neurology consult for evaluation of neurodegenerative conditions predisposing his symptoms   PT-OT   No additional cardiac testing needed at present time, patient has a follow up with Dr Groves on 6/19.   Please call us back with questions or concerns.

## 2024-06-03 NOTE — PHYSICAL THERAPY INITIAL EVALUATION ADULT - ADDITIONAL COMMENTS
pt lives with his son and daughter in law in a 2nd floor apartment with 15 steps to enter (+rail). uses a cane for amb. son and daughter in law work.

## 2024-06-03 NOTE — ED CDU PROVIDER SUBSEQUENT DAY NOTE - CLINICAL SUMMARY MEDICAL DECISION MAKING FREE TEXT BOX
64M pmh orthostatic hypotension, BPH, vertigo presenting with multiple syncopal episodes. In ED, troponins stable. CT head with expanded sella turcica, otherwise without acute findings. Dissection study without acute findings. Pt evaluated by cardiology, recommending increasing midodrine to 10mg TID and monitoring tele overnight. Pending TTE and PT eval. MR brain added and negative for acute findings. Of note cr 1.6, unknown baseline. Fluids given and pending repeat labs.

## 2024-06-03 NOTE — PHYSICAL THERAPY INITIAL EVALUATION ADULT - PERTINENT HX OF CURRENT PROBLEM, REHAB EVAL
64-year-old male with past medical history of orthostatic hypotension and BPH presents complaining of multiple syncopal episodes, was started on midodrine 5 mg, last week had increased to 10, takes it twice daily (1 pill in the morning, and 1 just before bed?). Today while in the shower he felt a syncopal episode coming on, laid down in the shower and lost consciousness. This has been happening with increasing regularity, usually while standing up for some time, often while urinating and occasionally in the shower. He had a similar episode in [facility] with multiple frequent syncopal episodes, was unable to be treated there so came to the [place] for further work up. He was started on 5 mg midodrine, had a CT angio of the coronaries and recently had it increased to 10 mg BID and initially felt it was helping, but now is not. He also takes finasteride in the morning. He initially could not recall the name of his cardiologist, but was able to find out that it was Dr. Verdugo. He notes some chest pain at this time, but does not usually have chest pain.

## 2024-06-04 VITALS
HEART RATE: 100 BPM | SYSTOLIC BLOOD PRESSURE: 100 MMHG | OXYGEN SATURATION: 98 % | TEMPERATURE: 98 F | DIASTOLIC BLOOD PRESSURE: 67 MMHG | RESPIRATION RATE: 18 BRPM

## 2024-06-04 PROCEDURE — T1013: CPT

## 2024-06-04 PROCEDURE — 99285 EMERGENCY DEPT VISIT HI MDM: CPT | Mod: 25

## 2024-06-04 PROCEDURE — 85025 COMPLETE CBC W/AUTO DIFF WBC: CPT

## 2024-06-04 PROCEDURE — 70551 MRI BRAIN STEM W/O DYE: CPT | Mod: MC

## 2024-06-04 PROCEDURE — 93005 ELECTROCARDIOGRAM TRACING: CPT

## 2024-06-04 PROCEDURE — 71045 X-RAY EXAM CHEST 1 VIEW: CPT

## 2024-06-04 PROCEDURE — 36415 COLL VENOUS BLD VENIPUNCTURE: CPT

## 2024-06-04 PROCEDURE — 80053 COMPREHEN METABOLIC PANEL: CPT

## 2024-06-04 PROCEDURE — 96360 HYDRATION IV INFUSION INIT: CPT | Mod: XU

## 2024-06-04 PROCEDURE — 70450 CT HEAD/BRAIN W/O DYE: CPT | Mod: MC

## 2024-06-04 PROCEDURE — 84484 ASSAY OF TROPONIN QUANT: CPT

## 2024-06-04 PROCEDURE — 71275 CT ANGIOGRAPHY CHEST: CPT | Mod: MC

## 2024-06-04 PROCEDURE — 82533 TOTAL CORTISOL: CPT

## 2024-06-04 PROCEDURE — 82962 GLUCOSE BLOOD TEST: CPT

## 2024-06-04 PROCEDURE — 99238 HOSP IP/OBS DSCHRG MGMT 30/<: CPT

## 2024-06-04 PROCEDURE — 84443 ASSAY THYROID STIM HORMONE: CPT

## 2024-06-04 PROCEDURE — 74174 CTA ABD&PLVS W/CONTRAST: CPT | Mod: MC

## 2024-06-04 PROCEDURE — 80048 BASIC METABOLIC PNL TOTAL CA: CPT

## 2024-06-04 PROCEDURE — G0378: CPT

## 2024-06-04 PROCEDURE — 93306 TTE W/DOPPLER COMPLETE: CPT

## 2024-06-04 PROCEDURE — 82607 VITAMIN B-12: CPT

## 2024-06-04 RX ORDER — FLUDROCORTISONE ACETATE 0.1 MG/1
0.1 TABLET ORAL DAILY
Refills: 0 | Status: DISCONTINUED | OUTPATIENT
Start: 2024-06-04 | End: 2024-06-10

## 2024-06-04 RX ORDER — FLUDROCORTISONE ACETATE 0.1 MG/1
1 TABLET ORAL
Qty: 30 | Refills: 0
Start: 2024-06-04 | End: 2024-07-03

## 2024-06-04 RX ORDER — MIDODRINE HYDROCHLORIDE 2.5 MG/1
1 TABLET ORAL
Qty: 90 | Refills: 0
Start: 2024-06-04 | End: 2024-07-03

## 2024-06-04 RX ORDER — SODIUM CHLORIDE 9 MG/ML
1000 INJECTION INTRAMUSCULAR; INTRAVENOUS; SUBCUTANEOUS ONCE
Refills: 0 | Status: COMPLETED | OUTPATIENT
Start: 2024-06-04 | End: 2024-06-04

## 2024-06-04 RX ADMIN — FINASTERIDE 5 MILLIGRAM(S): 5 TABLET, FILM COATED ORAL at 11:54

## 2024-06-04 RX ADMIN — SODIUM CHLORIDE 1000 MILLILITER(S): 9 INJECTION INTRAMUSCULAR; INTRAVENOUS; SUBCUTANEOUS at 00:50

## 2024-06-04 RX ADMIN — MIDODRINE HYDROCHLORIDE 10 MILLIGRAM(S): 2.5 TABLET ORAL at 06:33

## 2024-06-04 RX ADMIN — MIDODRINE HYDROCHLORIDE 10 MILLIGRAM(S): 2.5 TABLET ORAL at 11:54

## 2024-06-04 RX ADMIN — FLUDROCORTISONE ACETATE 0.1 MILLIGRAM(S): 0.1 TABLET ORAL at 11:54

## 2024-06-04 NOTE — ED CDU PROVIDER SUBSEQUENT DAY NOTE - WR INTERPRETATION 1
CXR negative - No infiltrates, No consolidation, No atelectasis seen, no mediastinal widening - [facility]
CXR negative - No infiltrates, No consolidation, No atelectasis seen, no mediastinal widening - [facility]

## 2024-06-04 NOTE — ED CDU PROVIDER DISPOSITION NOTE - CLINICAL COURSE
64M with pmh orthostatic hypotension, BPH, recurrent syncope presenting with recurrent syncope. Labs and imaging without acute findings. MR brain unremarkable. Pt was evaluated by cardiology recommending TTE. TTE ***********  Cardiology recommending increasing midodrine to TID and adding new fludrocortisone. Pt cleared by cardiology to fu with neuro outpt. All questions answered, given return precautions. 64M with pmh orthostatic hypotension, BPH, recurrent syncope presenting with recurrent syncope. Labs and imaging without acute findings. MR brain unremarkable. Pt was evaluated by cardiology recommending TTE. TTE with EF 62%. Cardiology recommending increasing midodrine to 10mg TID and adding fludrocortisone. Pt cleared by cardiology to fu with neuro outpt. All questions answered, given return precautions.

## 2024-06-04 NOTE — ED ADULT NURSE REASSESSMENT NOTE - NS ED NURSE REASSESS COMMENT FT1
Assumed care of patient at 07:20 from JULEE Hernandez. Charting as noted. Patient AA&Ox4, resp even/unlabored, presents to ED c/o multiple syncopal episodes. At time of assessment, patient denies pain/discomfort, denies CP/SOB. Patient updated on the plan of care, awaiting TTE and PT eval. Stretcher locked in lowest position, IV site flushed w/ NS. No redness, swelling or pain noted to site. No signs of acute distress noted, safety maintained. Pt remains on CM in NSR, rate 78, SpO2 98% on room air.
Assumed care of pt from WANG Zuniga RN at 1930. Pt is Welsh speaking. ED  Uriel. Pt is resting comfortably in stretcher. NAD. Pt is A&Ox4. Respirations are even and unlabored. Pt awaiting physical therapy reassessment in the morning. Pt updated on plan of care.
Physical therapist aMria A at bed side evaluating pt.
Pt alert and oriented, no apparent distress noted at this time. Pt handed off to RN in stable condition.
Pt is resting in stretcher comfortably at this time. NAD. VSS. Respirations even and unlabored. Pt safety maintained. Pt denies any complaints at this time. Plan of care on going.
Pt resting comfortably in stretcher, pt AAOX3, resp even and unlabored on RA, pt NS on CM, pt medicated per MD orders, pt offers no complaints at this time, safety maintained.
Pt resting comfortably in stretcher, pt AAOX3, resp even and unlabored on RA, pt NS on CM, pt on , pt educated about plan of care, pt demonstrates understanding through use of teach back method, pt given a urinal and a blanket, pt offers no complaints at this time, safety maintained.
Received report from BONG LADD and assumed care of pt. Patient a&ox3, no acute distress, resp nonlabored, resting comfortably in bed with family at bedside. Denies headache, dizziness, chest pain, palpitations, SOB, abd pain, n/v/d, urinary symptoms, fevers, chills, weakness at this time. Pt is aware of plan of care.
assumed care of pt from JULEE Cantu, pt brought to OBS from MRI, pt AAOX, resp. even and unlabored on RA, pt placed on CM/, pt admitted to OBS for frequent syncopal episodes including one in the shower today, pt denies headstrike, pos. LOC, pt awaiting TTE/PT, pt offers no complaints at this time, NS running @ 1000mL/hr, pt educated about plan of care, pt demonstrates understanding through use of teach back method, safety maintained
pt in MRI, report given to OBS JULEE Santamaria . Pt bp high prior to leaving the unit. MD made aware. Will continue to monitor.
No acute distress or change in mental status noted. All safety and fall precautions in place.
Pt is resting in stretcher comfortably at this time. NAD. Pt slightly hypotensive, NAT Ivey made aware. No new symptoms at this time. Respirations even and unlabored. Pt safety maintained. Pt denies any complaints at this time. Plan of care on going.

## 2024-06-04 NOTE — ED CDU PROVIDER DISPOSITION NOTE - CARE PROVIDERS DIRECT ADDRESSES
[email or web address]

## 2024-06-04 NOTE — ED CDU PROVIDER SUBSEQUENT DAY NOTE - NSICDXPASTMEDICALHX_GEN_ALL_CORE_FT
PAST MEDICAL HISTORY:  BPH without urinary obstruction     Orthostatic hypotension     
PAST MEDICAL HISTORY:  BPH without urinary obstruction     Orthostatic hypotension

## 2024-06-04 NOTE — ED CDU PROVIDER DISPOSITION NOTE - CARE PROVIDER_API CALL
Vlad Shaffer  Neurology  [address] [place] [address], [address]  [place] [address]  Phone: [phone]  Fax: [phone]  Follow Up Time:    Vlad Shaffer  Neurology  [address], [address]  [place] [address]  Phone: [phone]  Fax: [phone]  Follow Up Time:     [facility] [place]  Cardiology  [address] [place] [address]  Phone: [phone]  Fax: [phone]  Follow Up Time:

## 2024-06-04 NOTE — ED CDU PROVIDER DISPOSITION NOTE - PATIENT PORTAL LINK FT
You can access the FollowMyHealth Patient Portal offered by [facility] by registering at the following website: [email or web address]. By joining [employer]’s FollowMyHealth portal, you will also be able to view your health information using other applications (apps) compatible with our system.

## 2024-06-04 NOTE — ED CDU PROVIDER SUBSEQUENT DAY NOTE - NS ED ROS FT
Const: Denies fever, chills  HEENT: Denies blurry vision, sore throat  Neck: Denies neck pain/stiffness  Resp: Denies coughing, SOB  Cardiovascular:  + orthostatic hypotension. Denies palpitations, LE edema  GI: Denies nausea, vomiting, abdominal pain, diarrhea, constipation, blood in stool  : Denies urinary frequency/urgency/dysuria, hematuria  MSK: Denies back pain  Neuro: Denies HA, dizziness, numbness, weakness  Skin: Denies rashes.
see hpi

## 2024-06-04 NOTE — ED CDU PROVIDER DISPOSITION NOTE - NSFOLLOWUPINSTRUCTIONS_ED_ALL_ED_FT
Follow up with neurologist outpt.     Syncope    Syncope is when you temporarily lose consciousness, also called fainting or passing out. It is caused by a sudden decrease in blood flow to the brain. Even though most causes of syncope are not dangerous, syncope can possibly be a sign of a serious medical problem. Signs that you may be about to faint include feeling dizzy, lightheaded, nausea, visual changes, or cold/clammy skin. Do not drive, operate heavy machinery, or play sports until your health care provider says it is okay.    SEEK IMMEDIATE MEDICAL CARE IF YOU HAVE ANY OF THE FOLLOWING SYMPTOMS: severe headache, pain in your chest/abdomen/back, bleeding from your mouth or rectum, palpitations, shortness of breath, pain with breathing, seizure, confusion, or trouble walking. Follow up with neurologist outpt.   Follow up with your cardiologist.     Syncope    Syncope is when you temporarily lose consciousness, also called fainting or passing out. It is caused by a sudden decrease in blood flow to the brain. Even though most causes of syncope are not dangerous, syncope can possibly be a sign of a serious medical problem. Signs that you may be about to faint include feeling dizzy, lightheaded, nausea, visual changes, or cold/clammy skin. Do not drive, operate heavy machinery, or play sports until your health care provider says it is okay.    SEEK IMMEDIATE MEDICAL CARE IF YOU HAVE ANY OF THE FOLLOWING SYMPTOMS: severe headache, pain in your chest/abdomen/back, bleeding from your mouth or rectum, palpitations, shortness of breath, pain with breathing, seizure, confusion, or trouble walking.

## 2024-06-04 NOTE — ED CDU PROVIDER SUBSEQUENT DAY NOTE - CLINICAL SUMMARY MEDICAL DECISION MAKING FREE TEXT BOX
64M pmh orthostatic hypotension, BPH, vertigo presenting with syncope. CT angio, head and MR head without acute findings. cardiology evaluated and recommended TTE, gave preliminary results to TTE and signed off now pending official read. pending PT eval for stair assessment in AM 64M pmh orthostatic hypotension, BPH, vertigo presenting with syncope. CT angio, head and MR head without acute findings. cardiology evaluated and recommended TTE, gave preliminary results to TTE and signed off now pending official read. pending PT eval for stair assessment in AM. patient should see neurology as an outpatient to eval for neurodegenerative conditions predisposing his symptoms which will be outpatient workup.

## 2024-06-04 NOTE — ED CDU PROVIDER DISPOSITION NOTE - PROVIDER TOKENS
PROVIDER:[TOKEN:[[id number]]] PROVIDER:[TOKEN:[[id number]]],PROVIDER:[TOKEN:[[id number]]]

## 2024-06-06 PROBLEM — N40.0 BENIGN PROSTATIC HYPERPLASIA WITHOUT LOWER URINARY TRACT SYMPTOMS: Chronic | Status: ACTIVE | Noted: 2024-06-02

## 2024-06-06 PROBLEM — I95.1 ORTHOSTATIC HYPOTENSION: Chronic | Status: ACTIVE | Noted: 2024-06-02

## 2024-06-21 ENCOUNTER — APPOINTMENT (OUTPATIENT)
Dept: NEUROLOGY | Facility: CLINIC | Age: 64
End: 2024-06-21
Payer: MEDICARE

## 2024-06-21 VITALS — SYSTOLIC BLOOD PRESSURE: 118 MMHG | DIASTOLIC BLOOD PRESSURE: 70 MMHG

## 2024-06-21 VITALS
SYSTOLIC BLOOD PRESSURE: 118 MMHG | DIASTOLIC BLOOD PRESSURE: 70 MMHG | HEIGHT: 70 IN | WEIGHT: 170 LBS | BODY MASS INDEX: 24.34 KG/M2

## 2024-06-21 DIAGNOSIS — M54.2 CERVICALGIA: ICD-10-CM

## 2024-06-21 DIAGNOSIS — Z87.19 PERSONAL HISTORY OF OTHER DISEASES OF THE DIGESTIVE SYSTEM: ICD-10-CM

## 2024-06-21 DIAGNOSIS — Z87.438 PERSONAL HISTORY OF OTHER DISEASES OF MALE GENITAL ORGANS: ICD-10-CM

## 2024-06-21 DIAGNOSIS — R55 SYNCOPE AND COLLAPSE: ICD-10-CM

## 2024-06-21 DIAGNOSIS — Z78.9 OTHER SPECIFIED HEALTH STATUS: ICD-10-CM

## 2024-06-21 PROCEDURE — 99204 OFFICE O/P NEW MOD 45 MIN: CPT

## 2024-06-21 PROCEDURE — G2211 COMPLEX E/M VISIT ADD ON: CPT

## 2024-06-21 RX ORDER — FLUDROCORTISONE ACETATE 0.1 MG/1
0.1 TABLET ORAL
Refills: 0 | Status: ACTIVE | COMMUNITY

## 2024-06-21 RX ORDER — CHROMIUM 200 MCG
TABLET ORAL
Refills: 0 | Status: COMPLETED | COMMUNITY
End: 2024-06-21

## 2024-06-21 RX ORDER — TERBINAFINE HYDROCHLORIDE 250 MG/1
250 TABLET ORAL
Refills: 0 | Status: COMPLETED | COMMUNITY
End: 2024-06-21

## 2024-06-21 RX ORDER — MIDODRINE HYDROCHLORIDE 10 MG/1
10 TABLET ORAL
Refills: 0 | Status: ACTIVE | COMMUNITY

## 2024-06-21 NOTE — PHYSICAL EXAM
[General Appearance - Alert] : alert [General Appearance - In No Acute Distress] : in no acute distress [Oriented To Time, Place, And Person] : oriented to person, place, and time [Affect] : the affect was normal [Memory Recent] : recent memory was not impaired [Memory Remote] : remote memory was not impaired [Dysarthria] : no dysarthria [Aphasia] : no dysphasia/aphasia [Cranial Nerves Optic (II)] : visual acuity intact bilaterally,  visual fields full to confrontation, pupils equal round and reactive to light [Cranial Nerves Oculomotor (III)] : extraocular motion intact [Cranial Nerves Trigeminal (V)] : facial sensation intact symmetrically [Cranial Nerves Facial (VII)] : face symmetrical [Cranial Nerves Vestibulocochlear (VIII)] : hearing was intact bilaterally [Cranial Nerves Glossopharyngeal (IX)] : tongue and palate midline [Cranial Nerves Accessory (XI - Cranial And Spinal)] : head turning and shoulder shrug symmetric [Cranial Nerves Hypoglossal (XII)] : there was no tongue deviation with protrusion [Motor Tone] : muscle tone was normal in all four extremities [Motor Strength] : muscle strength was normal in all four extremities [Sensation Tactile Decrease] : light touch was intact [Sensation Pain / Temperature Decrease] : pain and temperature was intact [Sensation Vibration Decrease] : vibration was intact [Limited Balance] : the patient's balance was impaired [Coordination - Dysmetria Impaired Finger-to-Nose Bilateral] : not present [2+] : Ankle jerk left 2+ [Plantar Reflex Right Only] : normal on the right [Plantar Reflex Left Only] : normal on the left [FreeTextEntry8] : Ambulation required a cane. [Optic Disc Abnormality] : the optic disc were normal in size and color

## 2024-06-21 NOTE — DATA REVIEWED
[de-identified] : Brain MRI was performed on 6/2/2024. The study was unremarkable. There was some mild chronic ischemic change.

## 2024-06-21 NOTE — ASSESSMENT
[FreeTextEntry1] : This is a 64-year-old man who had 2 episodes of syncope. The suspicion was that he was orthostatic. He is now on midodrine and Florinef.  I would like to obtain an EEG to rule out any epileptiform abnormalities. If this is unremarkable then I would not pursue any further neurologic investigations.  He does have a long history of neck pains. I will refer him to pain management.  I will see him back in 6 months.

## 2024-06-21 NOTE — CONSULT LETTER
[Dear  ___] : Dear  [unfilled], [Courtesy Letter:] : I had the pleasure of seeing your patient, [unfilled], in my office today. [Please see my note below.] : Please see my note below. [Consult Closing:] : Thank you very much for allowing me to participate in the care of this patient.  If you have any questions, please do not hesitate to contact me. [Sincerely,] : Sincerely, [FreeTextEntry3] : Andrez Adam MD.

## 2024-06-21 NOTE — HISTORY OF PRESENT ILLNESS
[FreeTextEntry1] : I saw this patient in the office today.  He reports that he had passed out on 2 occasions in early June 2024. He was seen in [facility] initially. He was then later seen at [facility] emergency department.  Brain imaging was unremarkable. He admits to heavy drinking. He reports that he had not been intoxicated when he passed out.  In addition, he has a long history of neck pain for which she is status post fusion surgery. This has been acting up lately.

## 2024-07-01 ENCOUNTER — APPOINTMENT (OUTPATIENT)
Dept: NEUROLOGY | Facility: CLINIC | Age: 64
End: 2024-07-01
Payer: MEDICARE

## 2024-07-01 PROCEDURE — 93040 RHYTHM ECG WITH REPORT: CPT

## 2024-07-01 PROCEDURE — 95819 EEG AWAKE AND ASLEEP: CPT

## 2024-10-23 ENCOUNTER — APPOINTMENT (OUTPATIENT)
Dept: NEUROLOGY | Facility: CLINIC | Age: 64
End: 2024-10-23

## 2025-05-19 NOTE — ED CDU PROVIDER SUBSEQUENT DAY NOTE - NS ED ATTENDING STATEMENT MOD
[employer]  
Medication: Irbesartan-hydrochlorothiazide passed protocol.   Last office visit date: 3/28/2025  Next appointment scheduled?: Yes  6/11/2025    
This was a shared visit with the OTONIEL. I reviewed and verified the documentation.
This was a shared visit with the OTONIEL. I reviewed and verified the documentation.